# Patient Record
(demographics unavailable — no encounter records)

---

## 2024-10-18 NOTE — PHYSICAL EXAM
[Chaperone Present] : A chaperone was present in the examining room during all aspects of the physical examination [44543] : A chaperone was present during the pelvic exam. [Appropriately responsive] : appropriately responsive [Alert] : alert [No Acute Distress] : no acute distress [Oriented x3] : oriented x3 [Normal] : normal [FreeTextEntry2] : Mitzy RIVERA, Susana RUFF [___] : no abscess [] : no inflammation

## 2024-10-18 NOTE — PHYSICAL EXAM
[Chaperone Present] : A chaperone was present in the examining room during all aspects of the physical examination [[id number]] : A chaperone was present during the pelvic exam. [Appropriately responsive] : appropriately responsive [Alert] : alert [No Acute Distress] : no acute distress [Oriented x3] : oriented x3 [Normal] : normal [FreeTextEntry2] : Mitzy RIVERA, Susana RUFF [___] : no abscess [] : no inflammation

## 2024-10-18 NOTE — PLAN
[FreeTextEntry1] : 32yo w f/u of mgmt of Bartholin's abscess. Incision in left labia is healing, by pt report Bartholin's has decreased in size significantly since most ED visit, is not currently having any symptoms. - recommend continuing Sitz baths - resent abx to pharmacy Bactrim x7d - RTC in 1 week for possible I&D w marsupialization or Beverly catheter placement vs exp mgmt - RTC earlier if pain or s/sx of infection

## 2024-10-18 NOTE — HISTORY OF PRESENT ILLNESS
[FreeTextEntry1] : 34yo presents for ED follow-up of mgmt of Bartholin's abcess. States has had a cyst for 9 years that did not bother her, but then approx 2 months ago started to grow in size and was painful. Presented to [facility] ED on 8/25/24 and had bedside I&D by gynecology, w purulent drainage. Returned to [facility] ED on 10/16/24 w return of symptoms. Had another I&D on labia, with purulent drainage. Incision was left open and not packed no marsupialized nor had santoro cathether placed, and was prescribed Bactrim and DC'ed home. Later that day, represented to the ED for bleeding at incision site, incision site was sutured and pt told to follow-up w GYN for removal of stitches and wound check.  Pt states was unable to  prescribed abx bc unsure of which pharmacy ED sent it to.  Here, pt denies pain, fevers, chills, lightheadeness, dizziness, SOB, CP. [PapSmeardate] : 8/1/22 [TextBox_31] : wnl

## 2024-10-18 NOTE — HISTORY OF PRESENT ILLNESS
[FreeTextEntry1] : 34yo presents for ED follow-up of mgmt of Bartholin's abcess. States has had a cyst for 9 years that did not bother her, but then approx 2 months ago started to grow in size and was painful. Presented to [facility] ED on 8/25/24 and had bedside I&D by gynecology, w purulent drainage. Returned to [facility] ED on 10/16/24 w return of symptoms. Had another I&D on labia, with purulent drainage. Incision was left open and not packed no marsupialized nor had santoro cathether placed, and was prescribed Bactrim and DC'ed home. Later that day, represented to the ED for bleeding at incision site, incision site was sutured and pt told to follow-up w GYN for removal of stitches and wound check.  Pt states was unable to  prescribed abx bc unsure of which pharmacy ED sent it to.  Here, pt denies pain, fevers, chills, lightheadeness, dizziness, SOB, CP. [PapSmeardate] : 8/1/22 [TextBox_31] : wnl

## 2025-03-19 NOTE — PHYSICAL EXAM
[Chaperone Present] : A chaperone was present in the examining room during all aspects of the physical examination [Appropriately responsive] : appropriately responsive [Alert] : alert [No Acute Distress] : no acute distress [Oriented x3] : oriented x3 [Labia Majora] : normal [Labia Minora] : normal [Normal] : normal [FreeTextEntry4] : Left introitus - distal portion of Word catheter inside vaginal canal - proximal portion of catheter inside gland ~ 2 x 2 cm - mobile, non-tender, draining yellowish discharge, no erythema noted.  Patient educated on perineal care, counseled on how to tuck the distal portion of catheter back in to the vagina if it slips out.  Patient counseled to leave catheter w/in gland for definitive treatment for 4 weeks to create definitive drainage path; Perineal care and use of heating pad to sit on as a sitz bath on low heat daily for 10 mins reviewed.  All questions and concerns addressed, pt reassured resolution of symptoms with prolonged catheterization to create exit for glandular fluid production.

## 2025-03-19 NOTE — PLAN
[FreeTextEntry1] : (+)Left Bartholin's cyst - Word Catheter placed(03/15/25) - RTO for removal in 4 weeks or earlier if bothersome.

## 2025-03-19 NOTE — HISTORY OF PRESENT ILLNESS
[FreeTextEntry1] : Hx/o Left Bartholin's cyst - recurred for the 3rd time (03/15/25) went to [facility] - ED - s/p I&D and Word Catheter placement, started Bactrim BID x 7 days, presents for evaluation. Currently patient denies experiencing any pain or feeling the balloon of the catheter.   Hx/o Left Bartholin's cyst ~ 9 years, increased in size s/p I&D on (08/25/24) an (10/16/24).

## 2025-04-18 NOTE — PHYSICAL EXAM
[MA] : MA [FreeTextEntry1] : Kalia [Appropriately responsive] : appropriately responsive [Alert] : alert [No Acute Distress] : no acute distress [Oriented x3] : oriented x3 [FreeTextEntry2] : Left Word Catheter removed atraumatically

## 2025-04-18 NOTE — PLAN
[FreeTextEntry1] : Hx/o Irregular cycles - Hormone panel / Referral for Pelvic U/S given - RTO for results review; Perineal care for preventive Bartholin's cyst formation d/w pt; RTO for annual gyn exam.

## 2025-04-18 NOTE — HISTORY OF PRESENT ILLNESS
[FreeTextEntry1] : s/p Word Catheter removal for Left Bartholin's abscess (03/15/25) s/p placement ~ 5 weeks, removed today.  Complaining of irregular periods x 1 year.  LMP(25) Menarche~12 / menses often every 3 weeks and sometimes every 6 weeks, lasting 4-5days.  Last Pap(2022) Neg / (-) HPV   / s/p  x 2 (2016-F) / (2019-M) - Denies history of bladder dysfunction.   / Living w/  sexually active using withdrawal method,  travels a lot, not interested in alternate form of contraception.  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  Complaining of vulvovaginal irritation x 3 days.  Complaining of postmenopausal symptoms for several months; Mostly experiencing vaginal dryness / dyspareunia / hot flashes / mood changes / insomnia / reduced libido, requesting counseling today.  Postmenopausal since () or age 41.  Denies history of postmenopausal bleeding.  Last Pap(24) Neg / (-) HPV  Last Mammogram (2024) BIRAD#2; Referred by PCP - WNL; Requesting annual screening referral today.   / s/p    x 2   followed by C/S x 2 (2023-M/F) / (25-M/F) / STOP x 1 / ETOP x 1 - Denies history of bladder dysfunction.  Single /  /  /  / Living w/partner; Not sexually active for >3 years / Sexually active w/partner/;  Complaining of dyspareunia and reduced sexual libido.   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      Annual gyn exam - Pap/HPV/STD sent / Pap smear up to date; STD screen - Pt requested STD blood screen today; Acute / Subacute Vaginitis - Aptima sent - treat based on results, pt aware; (+) Urinary symptoms - Urine Culture sent - treat based on results, pt aware; Annual screening Mammogram referral given or up to date; History of Irregular cycles / Pelvic pain - Hormone  panel / Health maintenance panel ordered today / Referral for Pelvic U/S given; Contraception counseling provided - Rx Depo w/ Calcium and Vitamin D daily sent to pharmacy / Rx Apri -  refills sent to pharmacy; History of Bladder dysfunction - Urogynecology consult referral provided today / Daily Kegel's exercise reviewed; (+) Postmenopausal - Counseling provided / Healthy dietary lifestyle modifications / Daily weight bearing exercises / Daily Yoga stretches and Mental Health exercises reviewed;  RTO for results review.